# Patient Record
Sex: MALE | Race: WHITE
[De-identification: names, ages, dates, MRNs, and addresses within clinical notes are randomized per-mention and may not be internally consistent; named-entity substitution may affect disease eponyms.]

---

## 2019-09-09 ENCOUNTER — HOSPITAL ENCOUNTER (EMERGENCY)
Dept: HOSPITAL 61 - ER | Age: 49
Discharge: HOME | End: 2019-09-09
Payer: SELF-PAY

## 2019-09-09 VITALS — HEIGHT: 69 IN | WEIGHT: 180 LBS | BODY MASS INDEX: 26.66 KG/M2

## 2019-09-09 VITALS — DIASTOLIC BLOOD PRESSURE: 89 MMHG | SYSTOLIC BLOOD PRESSURE: 142 MMHG

## 2019-09-09 DIAGNOSIS — S40.812A: ICD-10-CM
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
## 2019-09-09 DIAGNOSIS — S60.511A: ICD-10-CM

## 2019-09-09 DIAGNOSIS — S30.811A: ICD-10-CM
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
## 2019-09-09 DIAGNOSIS — Z23: ICD-10-CM

## 2019-09-09 DIAGNOSIS — S42.002A: Primary | ICD-10-CM

## 2019-09-09 DIAGNOSIS — S50.812A: ICD-10-CM

## 2019-09-09 PROCEDURE — 99284 EMERGENCY DEPT VISIT MOD MDM: CPT

## 2019-09-09 PROCEDURE — 73000 X-RAY EXAM OF COLLAR BONE: CPT

## 2019-09-09 PROCEDURE — 73130 X-RAY EXAM OF HAND: CPT

## 2019-09-09 PROCEDURE — 90471 IMMUNIZATION ADMIN: CPT

## 2019-09-09 PROCEDURE — 73080 X-RAY EXAM OF ELBOW: CPT

## 2019-09-09 PROCEDURE — 90715 TDAP VACCINE 7 YRS/> IM: CPT
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
